# Patient Record
Sex: MALE | Race: WHITE | NOT HISPANIC OR LATINO | Employment: UNEMPLOYED | URBAN - METROPOLITAN AREA
[De-identification: names, ages, dates, MRNs, and addresses within clinical notes are randomized per-mention and may not be internally consistent; named-entity substitution may affect disease eponyms.]

---

## 2017-02-22 ENCOUNTER — TELEPHONE (OUTPATIENT)
Dept: FAMILY MEDICINE CLINIC | Facility: CLINIC | Age: 42
End: 2017-02-22

## 2017-02-24 ENCOUNTER — OFFICE VISIT (OUTPATIENT)
Dept: FAMILY MEDICINE CLINIC | Facility: CLINIC | Age: 42
End: 2017-02-24

## 2017-02-24 VITALS
HEIGHT: 73 IN | WEIGHT: 218 LBS | RESPIRATION RATE: 16 BRPM | SYSTOLIC BLOOD PRESSURE: 110 MMHG | OXYGEN SATURATION: 99 % | BODY MASS INDEX: 28.89 KG/M2 | HEART RATE: 101 BPM | DIASTOLIC BLOOD PRESSURE: 80 MMHG

## 2017-02-24 DIAGNOSIS — Z01.84 IMMUNITY STATUS TESTING: Primary | ICD-10-CM

## 2017-02-24 DIAGNOSIS — Z23 NEED FOR VACCINATION: ICD-10-CM

## 2017-02-24 DIAGNOSIS — Z86.11 HISTORY OF TB (TUBERCULOSIS): ICD-10-CM

## 2017-02-24 PROCEDURE — 90471 IMMUNIZATION ADMIN: CPT | Performed by: FAMILY MEDICINE

## 2017-02-24 PROCEDURE — 90715 TDAP VACCINE 7 YRS/> IM: CPT | Performed by: FAMILY MEDICINE

## 2017-02-24 PROCEDURE — 99213 OFFICE O/P EST LOW 20 MIN: CPT | Performed by: FAMILY MEDICINE

## 2017-02-24 RX ORDER — EFAVIRENZ 600 MG
TABLET ORAL
COMMUNITY
Start: 2016-12-31

## 2017-02-24 RX ORDER — EMTRICITABINE AND TENOFOVIR ALAFENAMIDE 200; 25 MG/1; MG/1
TABLET ORAL
COMMUNITY
Start: 2017-01-31

## 2017-02-24 NOTE — PROGRESS NOTES
Subjective   Wilman Graves is a 41 y.o. male.     History of Present Illness   Wilman is a pt of [employer].  He is here requesting a TDAP  For an educational program.  He states his last one was 9 yrs ago.  He is also requesting lab work for MMR titer and to have a TB questionarre filed.  He had a + Quantaferon in the past that was positive.  He was treated and had subsequent chest sray.    The following portions of the patient's history were reviewed and updated as appropriate: allergies, current medications, past medical history, past social history and problem list.    Review of Systems   All other systems reviewed and are negative.      Objective   Physical Exam   Constitutional: He is oriented to person, place, and time. He appears well-developed.   Cardiovascular: Normal rate and regular rhythm.    Pulmonary/Chest: Effort normal and breath sounds normal.   Neurological: He is alert and oriented to person, place, and time.   Skin: Skin is warm and dry. No rash noted.   Nursing note and vitals reviewed.      Assessment/Plan   Wilman was seen today for immunizations.    Diagnoses and all orders for this visit:    Immunity status testing  -     Measles / Mumps / Rubella Immunity    Need for vaccination  -     Tdap Vaccine Greater Than or Equal To 6yo IM    History of TB (tuberculosis)    Form filled out as required by school program

## 2017-02-25 LAB
MEV IGG SER IA-ACNC: >300 AU/ML
MUV IGG SER IA-ACNC: 58 AU/ML
RUBV IGG SERPL IA-ACNC: 1.98 INDEX

## 2017-02-26 PROBLEM — A15.9 TB (TUBERCULOSIS): Status: ACTIVE | Noted: 2017-02-26

## 2017-04-24 ENCOUNTER — OFFICE VISIT (OUTPATIENT)
Dept: FAMILY MEDICINE CLINIC | Facility: CLINIC | Age: 42
End: 2017-04-24

## 2017-04-24 VITALS
SYSTOLIC BLOOD PRESSURE: 110 MMHG | HEART RATE: 88 BPM | HEIGHT: 73 IN | WEIGHT: 225 LBS | BODY MASS INDEX: 29.82 KG/M2 | DIASTOLIC BLOOD PRESSURE: 72 MMHG | OXYGEN SATURATION: 98 %

## 2017-04-24 DIAGNOSIS — Z02.0 SCHOOL PHYSICAL EXAM: Primary | ICD-10-CM

## 2017-04-24 PROCEDURE — 99213 OFFICE O/P EST LOW 20 MIN: CPT | Performed by: NURSE PRACTITIONER

## 2017-04-24 NOTE — PROGRESS NOTES
Subjective   Wilman Graves is a 42 y.o. male.     History of Present Illness Patient presents for to discuss paperwork. Pt is enrolled at [employer] school program and needs paperwork filled out and physical forms completed.   [facility] and [facility] and labs with + titers and treatment for TB. Pt had a recent negative chest xray.     The following portions of the patient's history were reviewed and updated as appropriate: allergies, current medications, past family history, past medical history, past social history, past surgical history and problem list.    Review of Systems   Respiratory: Negative for apnea, cough and shortness of breath.    All other systems reviewed and are negative.      Objective   Physical Exam   Constitutional: He is oriented to person, place, and time. Vital signs are normal. He appears well-developed and well-nourished.   HENT:   Head: Normocephalic and atraumatic.   Right Ear: External ear normal.   Left Ear: External ear normal.   Nose: Nose normal.   Mouth/Throat: Oropharynx is clear and moist.   Eyes: Conjunctivae and EOM are normal. Pupils are equal, round, and reactive to light.   Neck: Normal range of motion. Neck supple.   Cardiovascular: Normal rate, regular rhythm, normal heart sounds and intact distal pulses.    Pulmonary/Chest: Effort normal and breath sounds normal.   Abdominal: Soft. Normal appearance and bowel sounds are normal.   Musculoskeletal: Normal range of motion.   Neurological: He is alert and oriented to person, place, and time. He has normal reflexes.   Skin: Skin is warm and dry.   Psychiatric: He has a normal mood and affect. His behavior is normal. Judgment and thought content normal.   Nursing note and vitals reviewed.      Assessment/Plan   Wilman was seen today for tb test.    Diagnoses and all orders for this visit:    School physical exam